# Patient Record
Sex: FEMALE | Race: WHITE
[De-identification: names, ages, dates, MRNs, and addresses within clinical notes are randomized per-mention and may not be internally consistent; named-entity substitution may affect disease eponyms.]

---

## 2020-07-02 ENCOUNTER — HOSPITAL ENCOUNTER (OUTPATIENT)
Dept: HOSPITAL 84 - D.NM | Age: 55
Discharge: HOME | End: 2020-07-02
Attending: INTERNAL MEDICINE
Payer: MEDICAID

## 2020-07-02 VITALS — BODY MASS INDEX: 26.6 KG/M2

## 2020-07-02 DIAGNOSIS — R10.13: Primary | ICD-10-CM

## 2020-08-20 ENCOUNTER — HOSPITAL ENCOUNTER (OUTPATIENT)
Dept: HOSPITAL 84 - D.PAN | Age: 55
Discharge: HOME | End: 2020-08-20
Attending: SURGERY
Payer: MEDICAID

## 2020-08-20 VITALS
HEIGHT: 64 IN | BODY MASS INDEX: 26.29 KG/M2 | BODY MASS INDEX: 26.29 KG/M2 | HEIGHT: 64 IN | WEIGHT: 154 LBS | WEIGHT: 154 LBS

## 2020-08-20 VITALS — DIASTOLIC BLOOD PRESSURE: 72 MMHG | SYSTOLIC BLOOD PRESSURE: 138 MMHG

## 2020-08-20 DIAGNOSIS — J45.909: ICD-10-CM

## 2020-08-20 DIAGNOSIS — K21.9: ICD-10-CM

## 2020-08-20 DIAGNOSIS — K43.9: ICD-10-CM

## 2020-08-20 DIAGNOSIS — I10: ICD-10-CM

## 2020-08-20 DIAGNOSIS — K82.8: Primary | ICD-10-CM

## 2020-08-20 LAB
ANION GAP SERPL CALC-SCNC: 7.7 MMOL/L (ref 8–16)
BASOPHILS NFR BLD AUTO: 0.7 % (ref 0–2)
BUN SERPL-MCNC: 9 MG/DL (ref 7–18)
CALCIUM SERPL-MCNC: 8.8 MG/DL (ref 8.5–10.1)
CHLORIDE SERPL-SCNC: 105 MMOL/L (ref 98–107)
CO2 SERPL-SCNC: 31.4 MMOL/L (ref 21–32)
CREAT SERPL-MCNC: 0.9 MG/DL (ref 0.6–1.3)
EOSINOPHIL NFR BLD: 3.5 % (ref 0–7)
ERYTHROCYTE [DISTWIDTH] IN BLOOD BY AUTOMATED COUNT: 12.6 % (ref 11.5–14.5)
GLUCOSE SERPL-MCNC: 106 MG/DL (ref 74–106)
HCT VFR BLD CALC: 39.3 % (ref 36–48)
HGB BLD-MCNC: 13.6 G/DL (ref 12–16)
IMM GRANULOCYTES NFR BLD: 0.2 % (ref 0–5)
LYMPHOCYTES NFR BLD AUTO: 38.2 % (ref 15–50)
MCH RBC QN AUTO: 30.8 PG (ref 26–34)
MCHC RBC AUTO-ENTMCNC: 34.6 G/DL (ref 31–37)
MCV RBC: 89.1 FL (ref 80–100)
MONOCYTES NFR BLD: 10.7 % (ref 2–11)
NEUTROPHILS NFR BLD AUTO: 46.7 % (ref 40–80)
OSMOLALITY SERPL CALC.SUM OF ELEC: 279 MOSM/KG (ref 275–300)
PLATELET # BLD: 160 10X3/UL (ref 130–400)
PMV BLD AUTO: 9.8 FL (ref 7.4–10.4)
POTASSIUM SERPL-SCNC: 3.1 MMOL/L (ref 3.5–5.1)
RBC # BLD AUTO: 4.41 10X6/UL (ref 4–5.4)
SODIUM SERPL-SCNC: 141 MMOL/L (ref 136–145)
WBC # BLD AUTO: 4.3 10X3/UL (ref 4.8–10.8)

## 2020-08-20 NOTE — NUR
1020 PT DROWSY ON NC 2LITER.  AT BEDSIDE. IVF BOLUS GIVEN.
1120 PT GIVEN ICE CREAM. AND ICE PACK TO ABDOMEN. B/P LOW, PT STATED
SHE DIDNT SLEEP ALL NIGHT. PT HAS SLEEP APNEA WITH CPAP. 1330 PT
RECIEVED A D5W 500 BOLUS. VOMITED 300 ML OF EMESIS IN BAG AND SITTING
ON SIDE OF BED.

## 2020-08-20 NOTE — OP
PATIENT NAME:  KARIS WALDEN                        MEDICAL RECORD: [id number]
:65                                             LOCATION:D.PAN          
                                                         ADMISSION DATE:        
SURGEON:  BARBER SAMSON MD          
 
 
DATE OF OPERATION:  2020
 
PREOPERATIVE DIAGNOSES:
1.  Biliary dyskinesia.
2.  Ventral hernia.
3.  Hypertension.
4.  Gastroesophageal reflux disease.
5.  Asthma.
 
POSTOPERATIVE DIAGNOSES:
1.  Biliary dyskinesia.
2.  Ventral hernia.
3.  Hypertension.
4.  Gastroesophageal reflux disease.
5.  Asthma.
 
PROCEDURE:
1.  Laparoscopic cholecystectomy.
2.  Ventral hernia repair.
 
SURGEON:  Barber Samson MD
 
REPORT OF PROCEDURE:  The patient's abdomen was prepped and draped in sterile
fashion.  A semicircular incision was made on the inferior aspect of the
umbilicus.  Electrocautery was used to dissect through the subcutaneous tissue. 
There was a hernia defect present near the base of the umbilicus.  We came
through this hernia sac and was able to see there was some fatty tissue
extending through the hernia.  The hernia defect was around a cm in greatest
diameter.  We placed 0 Vicryls on the edges of the fascial defect and bluntly
entered the abdominal cavity.  A 12-mm Julianne port was then inserted.  After
insufflation was obtained, then 5-mm trocars were placed in the epigastrium and
2 more were placed in the right subcostal region.  The gallbladder was grasped
and elevated.  There were no signs of any inflammatory changes.  The cystic
artery and cystic duct were dissected free and these were clipped proximally and
distally and ligated in standard fashion.  The gallbladder was taken off the
liver bed using electrocautery and placed into an Endo Catch bag.  We irrigated
out the right upper quadrant and any bleeding from the liver bed was treated
with electrocautery.  At this point, the ports and insufflation were then
remained and the gallbladder was taken out through the umbilicus.  The fascial
defect near the umbilicus was reapproximated with interrupted 0 Vicryls times 4.
 The umbilicus was then tacked down to the fascia using a single interrupted 3-0
Vicryl and the subcutaneous tissues were reapproximated with interrupted 3-0
Vicryl.  A total of 10 mL of 0.25% Marcaine with epinephrine was infused into
the surrounding tissues and the skin incisions were all closed with subcutaneous
5-0 Monocryl.
 
COMPLICATIONS:  None.
 
CONDITION:  Stable.
 
ANESTHESIA:  General endotracheal and local.
 
 
 
 
OPERATIVE REPORT                               [id number]    KARIS WALDEN      
 
 
BLOOD LOSS:  Minimal.
 
TRANSINT:[id number] Voice Confirmation ID: [id number] DOCUMENT ID: [id number]
                                           
                                           BARBER SAMSON MD          
 
 
 
 
 
 
 
 
 
 
 
 
 
 
 
 
 
 
 
 
 
 
 
 
 
 
 
 
 
 
 
 
 
 
 
 
 
 
 
 
 
 
CC: ALAN LINTON MD                                         [id number]
DICTATION DATE: 20 0843     :     20 1106      [facility]                                          
[address] [place] [address]

## 2021-05-04 ENCOUNTER — HOSPITAL ENCOUNTER (OUTPATIENT)
Dept: HOSPITAL 84 - D.US | Age: 56
Discharge: HOME | End: 2021-05-04
Attending: INTERNAL MEDICINE
Payer: COMMERCIAL

## 2021-05-04 VITALS — BODY MASS INDEX: 26.5 KG/M2

## 2021-05-04 DIAGNOSIS — K76.0: Primary | ICD-10-CM

## 2021-05-04 LAB
ALBUMIN SERPL-MCNC: 3.6 G/DL (ref 3.4–5)
ALP SERPL-CCNC: 88 U/L (ref 30–120)
ALT SERPL-CCNC: 56 U/L (ref 10–68)
BILIRUB DIRECT SERPL-MCNC: 0.1 MG/DL (ref 0–0.3)
BILIRUB INDIRECT SERPL-MCNC: 0.33 MG/DL (ref 0–1)
BILIRUB SERPL-MCNC: 0.43 MG/DL (ref 0.2–1.3)
GLOBULIN SER-MCNC: 3.2 G/L
PROT SERPL-MCNC: 6.8 G/DL (ref 6.4–8.2)

## 2021-10-08 NOTE — NUR
1234 MEDICATED WITH PHENERGAN IV 5MG  WITH GOOD RESULTS PT BACK ON NC
O2 4LITER. SEDATED. ARROUSES TO PHYSICAL STIMULI. 1550 IV REMOVED AND
INSTRUCTION GIVEN. Trace